# Patient Record
Sex: FEMALE | Race: OTHER | HISPANIC OR LATINO | URBAN - METROPOLITAN AREA
[De-identification: names, ages, dates, MRNs, and addresses within clinical notes are randomized per-mention and may not be internally consistent; named-entity substitution may affect disease eponyms.]

---

## 2019-07-16 ENCOUNTER — OUTPATIENT (OUTPATIENT)
Dept: OUTPATIENT SERVICES | Facility: HOSPITAL | Age: 69
LOS: 1 days | End: 2019-07-16
Payer: MEDICAID

## 2019-07-16 VITALS
SYSTOLIC BLOOD PRESSURE: 134 MMHG | RESPIRATION RATE: 17 BRPM | HEART RATE: 75 BPM | HEIGHT: 56 IN | TEMPERATURE: 98 F | WEIGHT: 145.06 LBS | DIASTOLIC BLOOD PRESSURE: 68 MMHG | OXYGEN SATURATION: 95 %

## 2019-07-16 VITALS
OXYGEN SATURATION: 96 % | DIASTOLIC BLOOD PRESSURE: 65 MMHG | RESPIRATION RATE: 19 BRPM | HEART RATE: 71 BPM | SYSTOLIC BLOOD PRESSURE: 107 MMHG

## 2019-07-16 DIAGNOSIS — Z98.51 TUBAL LIGATION STATUS: Chronic | ICD-10-CM

## 2019-07-16 DIAGNOSIS — H25.811 COMBINED FORMS OF AGE-RELATED CATARACT, RIGHT EYE: ICD-10-CM

## 2019-07-16 DIAGNOSIS — H25.812 COMBINED FORMS OF AGE-RELATED CATARACT, LEFT EYE: ICD-10-CM

## 2019-07-16 PROCEDURE — C1780: CPT

## 2019-07-16 PROCEDURE — 66984 XCAPSL CTRC RMVL W/O ECP: CPT | Mod: RT

## 2019-07-16 NOTE — ASU DISCHARGE PLAN (ADULT/PEDIATRIC) - PATIENT EDUCATION MATERIALS PROVIED
Implant card (specify)/Intraocular lens implant(IOL), Eye shield with instructions , sunglasses and eye kit given to patient./Other (specify)

## 2019-07-16 NOTE — ASU DISCHARGE PLAN (ADULT/PEDIATRIC) - CARE PROVIDER_API CALL
Rosemary Cox)  Ophthalmology  [address] [place] [address], [address] [place] [address]  Phone: [phone]  Fax: [phone]  Follow Up Time:

## 2019-07-16 NOTE — ASU DISCHARGE PLAN (ADULT/PEDIATRIC) - CALL YOUR DOCTOR IF YOU HAVE ANY OF THE FOLLOWING:
Fever greater than (need to indicate Fahrenheit or Celsius)/Nausea and vomiting that does not stop/Pain not relieved by Medications/Swelling that gets worse/Bleeding that does not stop

## 2019-09-19 PROBLEM — N20.0 CALCULUS OF KIDNEY: Chronic | Status: ACTIVE | Noted: 2019-07-16

## 2019-09-19 PROBLEM — K21.9 GASTRO-ESOPHAGEAL REFLUX DISEASE WITHOUT ESOPHAGITIS: Chronic | Status: ACTIVE | Noted: 2019-07-16

## 2019-09-24 ENCOUNTER — OUTPATIENT (OUTPATIENT)
Dept: OUTPATIENT SERVICES | Facility: HOSPITAL | Age: 69
LOS: 1 days | End: 2019-09-24
Payer: MEDICAID

## 2019-09-24 VITALS
RESPIRATION RATE: 15 BRPM | DIASTOLIC BLOOD PRESSURE: 59 MMHG | SYSTOLIC BLOOD PRESSURE: 132 MMHG | TEMPERATURE: 98 F | HEIGHT: 56 IN | OXYGEN SATURATION: 96 % | HEART RATE: 74 BPM | WEIGHT: 145.51 LBS

## 2019-09-24 VITALS
HEART RATE: 79 BPM | DIASTOLIC BLOOD PRESSURE: 58 MMHG | OXYGEN SATURATION: 98 % | RESPIRATION RATE: 18 BRPM | SYSTOLIC BLOOD PRESSURE: 123 MMHG

## 2019-09-24 DIAGNOSIS — Z98.51 TUBAL LIGATION STATUS: Chronic | ICD-10-CM

## 2019-09-24 DIAGNOSIS — H25.812 COMBINED FORMS OF AGE-RELATED CATARACT, LEFT EYE: ICD-10-CM

## 2019-09-24 DIAGNOSIS — Z98.41 CATARACT EXTRACTION STATUS, RIGHT EYE: Chronic | ICD-10-CM

## 2019-09-24 PROCEDURE — 66984 XCAPSL CTRC RMVL W/O ECP: CPT | Mod: LT

## 2019-09-24 PROCEDURE — V2632: CPT

## 2019-09-24 NOTE — ASU DISCHARGE PLAN (ADULT/PEDIATRIC) - CARE PROVIDER_API CALL
Rosemary Cox)  Ophthalmology  [address] [place] [address], [address] [place] [address]  Phone: [phone]  Fax: [phone]  Follow Up Time:

## 2019-09-24 NOTE — ASU DISCHARGE PLAN (ADULT/PEDIATRIC) - CALL YOUR DOCTOR IF YOU HAVE ANY OF THE FOLLOWING:
Swelling that gets worse/Nausea and vomiting that does not stop/Pain not relieved by Medications/Bleeding that does not stop/Fever greater than (need to indicate Fahrenheit or Celsius)

## 2023-03-23 ENCOUNTER — OFFICE (OUTPATIENT)
Dept: URBAN - METROPOLITAN AREA CLINIC 35 | Facility: CLINIC | Age: 73
Setting detail: OPHTHALMOLOGY
End: 2023-03-23
Payer: COMMERCIAL

## 2023-03-23 VITALS — BODY MASS INDEX: 25.52 KG/M2 | HEIGHT: 60 IN | WEIGHT: 130 LBS

## 2023-03-23 DIAGNOSIS — H11.153: ICD-10-CM

## 2023-03-23 DIAGNOSIS — H01.005: ICD-10-CM

## 2023-03-23 DIAGNOSIS — H00.15: ICD-10-CM

## 2023-03-23 DIAGNOSIS — Z96.1: ICD-10-CM

## 2023-03-23 DIAGNOSIS — H01.004: ICD-10-CM

## 2023-03-23 DIAGNOSIS — H01.002: ICD-10-CM

## 2023-03-23 DIAGNOSIS — H01.001: ICD-10-CM

## 2023-03-23 PROCEDURE — 99214 OFFICE O/P EST MOD 30 MIN: CPT | Performed by: OPHTHALMOLOGY

## 2023-03-23 ASSESSMENT — REFRACTION_MANIFEST
OD_CYLINDER: -1.50
OD_CYLINDER: -1.75
OD_AXIS: 100
OS_SPHERE: -0.25
OS_VA1: 20/20-2
OS_SPHERE: +2.25
OS_ADD: +2.75
OD_SPHERE: +1.00
OD_CYLINDER: -2.25
OD_VA1: 20/30
OS_CYLINDER: -2.00
OD_AXIS: 090
OD_AXIS: 090
OU_VA: 20/20-2
OD_CYLINDER: -2.00
OD_AXIS: 090
OS_AXIS: 085
OD_SPHERE: +0.50
OS_VA1: 20/20-2
OD_ADD: +3.00
OS_VA1: 20/70
OS_AXIS: 080
OS_CYLINDER: -1.75
OS_SPHERE: -0.50
OD_VA1: 20/20-
OS_SPHERE: -1.50
OS_AXIS: 80
OD_SPHERE: +1.00
OD_CYLINDER: -1.25
OD_AXIS: 095
OS_AXIS: 080
OS_ADD: +3.00
OS_VA1: 20/20
OD_SPHERE: +0.75
OD_VA1: 20/30
OD_CYLINDER: -1.00
OD_VA1: 20/25-3
OS_CYLINDER: -2.00
OS_VA1: 20/20-
OD_AXIS: 094
OD_SPHERE: +1.00
OD_SPHERE: +0.50
OD_ADD: +2.75
OD_VA1: 20/20
OD_VA1: 20/20-2
OS_CYLINDER: -2.25
OS_AXIS: 080
OS_CYLINDER: -1.25
OS_SPHERE: -0.50

## 2023-03-23 ASSESSMENT — LID EXAM ASSESSMENTS
OS_COMMENTS: INSPISSATION
OS_BLEPHARITIS: LLL LUL
OD_MEIBOMITIS: RLL RUL
OD_COMMENTS: INSPISSATION
OS_MEIBOMITIS: LLL LUL
OD_BLEPHARITIS: RLL RUL

## 2023-03-23 ASSESSMENT — REFRACTION_AUTOREFRACTION
OD_CYLINDER: -2.00
OD_AXIS: 094
OS_CYLINDER: -2.00
OS_AXIS: 085
OD_SPHERE: +1.25
OS_SPHERE: -0.25

## 2023-03-23 ASSESSMENT — AXIALLENGTH_DERIVED
OS_AL: 21.7783
OD_AL: 22.4141
OS_AL: 22.8677
OD_AL: 22.5024
OD_AL: 22.2397
OD_AL: 22.5468
OD_AL: 22.4582
OS_AL: 22.7766
OS_AL: 23.1926
OS_AL: 22.9136
OD_AL: 22.3266
OS_AL: 22.7766
OD_AL: 22.3703

## 2023-03-23 ASSESSMENT — REFRACTION_CURRENTRX
OD_CYLINDER: -1.25
OD_VPRISM_DIRECTION: BF
OD_SPHERE: +0.75
OD_VPRISM_DIRECTION: BF
OD_AXIS: 090
OD_CYLINDER: -1.00
OD_OVR_VA: 20/
OS_OVR_VA: 20/
OS_SPHERE: -0.50
OS_AXIS: 080
OS_OVR_VA: 20/
OD_ADD: +3.00
OD_ADD: +3.25
OS_VPRISM_DIRECTION: BF
OS_VPRISM_DIRECTION: BF
OD_AXIS: 095
OD_OVR_VA: 20/
OS_SPHERE: -0.50
OS_ADD: +3.25
OS_CYLINDER: -2.00
OS_AXIS: 080
OS_ADD: +3.00
OS_CYLINDER: -2.25
OD_SPHERE: +1.00

## 2023-03-23 ASSESSMENT — SPHEQUIV_DERIVED
OD_SPHEQUIV: 0
OS_SPHEQUIV: -1.25
OD_SPHEQUIV: 0.5
OD_SPHEQUIV: -0.125
OD_SPHEQUIV: -0.375
OS_SPHEQUIV: -2.375
OS_SPHEQUIV: -1.625
OD_SPHEQUIV: -0.25
OS_SPHEQUIV: -1.5
OD_SPHEQUIV: 0.25
OS_SPHEQUIV: -1.25
OD_SPHEQUIV: 0.125
OS_SPHEQUIV: 1.625

## 2023-03-23 ASSESSMENT — KERATOMETRY
OD_K1POWER_DIOPTERS: 46.25
OD_AXISANGLE_DEGREES: 009
OS_AXISANGLE_DEGREES: 163
OS_K1POWER_DIOPTERS: 46.50
OD_K2POWER_DIOPTERS: 47.50
METHOD_AUTO_MANUAL: AUTO
OS_K2POWER_DIOPTERS: 47.75

## 2023-03-23 ASSESSMENT — CONFRONTATIONAL VISUAL FIELD TEST (CVF)
OS_FINDINGS: FULL
OD_FINDINGS: FULL

## 2023-03-23 ASSESSMENT — TONOMETRY
OD_IOP_MMHG: 14
OS_IOP_MMHG: 15

## 2023-03-23 ASSESSMENT — VISUAL ACUITY
OS_BCVA: 20/25-3
OD_BCVA: 20/25-3

## 2024-04-05 ENCOUNTER — OFFICE (OUTPATIENT)
Dept: URBAN - METROPOLITAN AREA CLINIC 35 | Facility: CLINIC | Age: 74
Setting detail: OPHTHALMOLOGY
End: 2024-04-05
Payer: COMMERCIAL

## 2024-04-05 DIAGNOSIS — H11.153: ICD-10-CM

## 2024-04-05 PROBLEM — H02.89 MEIBOMIAN GLAND DYSFUNCTION: Status: ACTIVE | Noted: 2024-04-05

## 2024-04-05 PROCEDURE — 92014 COMPRE OPH EXAM EST PT 1/>: CPT | Performed by: OPHTHALMOLOGY

## 2024-04-05 ASSESSMENT — LID EXAM ASSESSMENTS
OS_BLEPHARITIS: LLL LUL
OD_MEIBOMITIS: RLL RUL 1+
OS_MEIBOMITIS: LLL LUL 1+
OS_COMMENTS: MEIBOMIAN GLAND INSPISSATION
OD_COMMENTS: MEIBOMIAN GLAND INSPISSATION
OD_BLEPHARITIS: RLL RUL
